# Patient Record
Sex: FEMALE | Race: BLACK OR AFRICAN AMERICAN | NOT HISPANIC OR LATINO | URBAN - METROPOLITAN AREA
[De-identification: names, ages, dates, MRNs, and addresses within clinical notes are randomized per-mention and may not be internally consistent; named-entity substitution may affect disease eponyms.]

---

## 2021-06-10 ENCOUNTER — OFFICE (OUTPATIENT)
Dept: URBAN - METROPOLITAN AREA CLINIC 10 | Facility: CLINIC | Age: 46
End: 2021-06-10

## 2021-06-10 VITALS
HEART RATE: 74 BPM | WEIGHT: 205 LBS | DIASTOLIC BLOOD PRESSURE: 74 MMHG | SYSTOLIC BLOOD PRESSURE: 117 MMHG | HEIGHT: 67 IN

## 2021-06-10 DIAGNOSIS — R74.8 ABNORMAL LEVELS OF OTHER SERUM ENZYMES: ICD-10-CM

## 2021-06-10 LAB
GGT: 185 IU/L — HIGH (ref 0–60)
HEPATIC FUNCTION PANEL (7): ALBUMIN: 4.9 G/DL — HIGH (ref 3.8–4.8)
HEPATIC FUNCTION PANEL (7): ALKALINE PHOSPHATASE: 122 IU/L — HIGH (ref 48–121)
HEPATIC FUNCTION PANEL (7): ALT (SGPT): 28 IU/L (ref 0–32)
HEPATIC FUNCTION PANEL (7): AST (SGOT): 29 IU/L (ref 0–40)
HEPATIC FUNCTION PANEL (7): BILIRUBIN, DIRECT: 0.11 MG/DL (ref 0–0.4)
HEPATIC FUNCTION PANEL (7): BILIRUBIN, TOTAL: 0.3 MG/DL (ref 0–1.2)
HEPATIC FUNCTION PANEL (7): PROTEIN, TOTAL: 8 G/DL (ref 6–8.5)
MITOCHONDRIAL (M2) ANTIBODY: <20 UNITS

## 2021-06-10 PROCEDURE — 99213 OFFICE O/P EST LOW 20 MIN: CPT | Performed by: PHYSICIAN ASSISTANT

## 2021-06-10 RX ORDER — SODIUM SULFATE, POTASSIUM SULFATE, MAGNESIUM SULFATE 17.5; 3.13; 1.6 G/ML; G/ML; G/ML
SOLUTION, CONCENTRATE ORAL
Qty: 1 | Refills: 0 | Status: COMPLETED
Start: 2021-06-10 | End: 2022-02-15

## 2021-07-27 ENCOUNTER — OFFICE (OUTPATIENT)
Dept: URBAN - METROPOLITAN AREA CLINIC 10 | Facility: CLINIC | Age: 46
End: 2021-07-27

## 2021-08-10 ENCOUNTER — OFFICE (OUTPATIENT)
Dept: URBAN - METROPOLITAN AREA CLINIC 10 | Facility: CLINIC | Age: 46
End: 2021-08-10

## 2021-08-10 VITALS
SYSTOLIC BLOOD PRESSURE: 120 MMHG | DIASTOLIC BLOOD PRESSURE: 62 MMHG | HEART RATE: 68 BPM | HEIGHT: 67 IN | OXYGEN SATURATION: 98 % | WEIGHT: 208 LBS

## 2021-08-10 DIAGNOSIS — R74.8 ABNORMAL LEVELS OF OTHER SERUM ENZYMES: ICD-10-CM

## 2021-08-10 LAB
ACTIN (SMOOTH MUSCLE) ANTIBODY: 15 UNITS (ref 0–19)
ANTINUCLEAR ANTIBODIES, IFA: NEGATIVE
GGT: 140 IU/L — HIGH (ref 0–60)
HEPATIC FUNCTION PANEL (7): ALBUMIN: 4.8 G/DL (ref 3.8–4.8)
HEPATIC FUNCTION PANEL (7): ALKALINE PHOSPHATASE: 120 IU/L (ref 48–121)
HEPATIC FUNCTION PANEL (7): ALT (SGPT): 25 IU/L (ref 0–32)
HEPATIC FUNCTION PANEL (7): AST (SGOT): 28 IU/L (ref 0–40)
HEPATIC FUNCTION PANEL (7): BILIRUBIN, DIRECT: 0.13 MG/DL (ref 0–0.4)
HEPATIC FUNCTION PANEL (7): BILIRUBIN, TOTAL: 0.4 MG/DL (ref 0–1.2)
HEPATIC FUNCTION PANEL (7): PROTEIN, TOTAL: 7.9 G/DL (ref 6–8.5)
IMMUNOGLOBULIN G, QN, SERUM: 1774 MG/DL — HIGH (ref 586–1602)

## 2021-08-10 PROCEDURE — 99213 OFFICE O/P EST LOW 20 MIN: CPT | Performed by: INTERNAL MEDICINE

## 2021-08-10 NOTE — SERVICEHPINOTES
Mrs. Crane is 45 years old. She is here today in referral for elevated ALP. She denies any ongoing abd pain particularly in the RUQ. She has no GI complaints. She denies EtOH use. Her insurance denied screening colonoscopy. She is no longer taking oral terbenafineShe has never had colon screening. SHe has no fam hx of CRC. She is interested in scheduling colon screening also at this time. 07/2021-AST 27/ALT 20/TB 0.3/alp 115/albumin 4.606/2021-abdominal ultrasound: Liver with normal appearance. Gallbladder normal. Common bile duct normal diameter.06/2021-AST 29/ALT 28/TB 0.3/alp 122/albumin 4.9BR-anti mitochondrial antibody negativeBR-GGT elevated at 185Records reviewed from PCP 05/05/2021:BRAlp 130BRAlp isoenzymes-liver 1 and liver 2 elevatedBRTotal bili 0.3BRFerritin 174/iron 6602/24/2021: WBC 4.6/hematocrit 34.9/MCV 83/platelets 214BRAST 27/ALT 23/alp 127/total bili 0.3/albumin 4.7

## 2022-02-15 ENCOUNTER — OFFICE (OUTPATIENT)
Dept: URBAN - METROPOLITAN AREA CLINIC 10 | Facility: CLINIC | Age: 47
End: 2022-02-15
Payer: COMMERCIAL

## 2022-02-15 VITALS
DIASTOLIC BLOOD PRESSURE: 66 MMHG | HEART RATE: 79 BPM | HEIGHT: 67 IN | WEIGHT: 214 LBS | OXYGEN SATURATION: 98 % | SYSTOLIC BLOOD PRESSURE: 129 MMHG

## 2022-02-15 DIAGNOSIS — R74.8 ABNORMAL LEVELS OF OTHER SERUM ENZYMES: ICD-10-CM

## 2022-02-15 DIAGNOSIS — Z12.11 ENCOUNTER FOR SCREENING FOR MALIGNANT NEOPLASM OF COLON: ICD-10-CM

## 2022-02-15 LAB
GGT: 97 IU/L — HIGH (ref 0–60)
HEPATIC FUNCTION PANEL (7): ALBUMIN: 4.5 G/DL (ref 3.8–4.8)
HEPATIC FUNCTION PANEL (7): ALKALINE PHOSPHATASE: 106 IU/L (ref 44–121)
HEPATIC FUNCTION PANEL (7): ALT (SGPT): 24 IU/L (ref 0–32)
HEPATIC FUNCTION PANEL (7): AST (SGOT): 23 IU/L (ref 0–40)
HEPATIC FUNCTION PANEL (7): BILIRUBIN, DIRECT: 0.13 MG/DL (ref 0–0.4)
HEPATIC FUNCTION PANEL (7): BILIRUBIN, TOTAL: 0.4 MG/DL (ref 0–1.2)
HEPATIC FUNCTION PANEL (7): PROTEIN, TOTAL: 7.4 G/DL (ref 6–8.5)
IMMUNOGLOBULIN G, QN, SERUM: 1516 MG/DL (ref 586–1602)
LIVER-KIDNEY MICROSOMAL AB: 1.8 UNITS (ref 0–20)

## 2022-02-15 PROCEDURE — 99214 OFFICE O/P EST MOD 30 MIN: CPT | Performed by: INTERNAL MEDICINE

## 2022-02-15 NOTE — SERVICEHPINOTES
Mrs. Crane is 45 years old. She is here today in for elevated ALP at referring physicains however, they have been normal at our office. She denies EtOH use. She denies any GI symptoms. She denies itching or increased fatigue.She has never had colon screening. Se has no fam history of colon cancer.
br
br08/2021-AST 28/ALT 25/TB 0.4/alp 120/albumin 4.8br-smooth muscle antibody negativebr-LOC negativebr-GGT 140br-IgG 1774 07/2021-AST 27/ALT 20/TB 0.3/alp 115/albumin 4.606/2021-abdominal ultrasound: Liver with normal appearance. Gallbladder normal. Common bile duct normal diameter.06/2021-AST 29/ALT 28/TB 0.3/alp 122/albumin 4.9br-anti mitochondrial antibody negativebr-GGT elevated at 185Records reviewed from PCP 05/05/2021:brAlp 130brAlp isoenzymes-liver 1 and liver 2 elevatedbrTotal bili 0.3brFerritin 174/iron 6602/24/2021: WBC 4.6/hematocrit 34.9/MCV 83/platelets 214brAST 27/ALT 23/alp 127/total bili 0.3/albumin 4.7

## 2022-02-15 NOTE — SERVICENOTES
We discussed screening colonoscopy. She is not interested currently. We discussed considering liver biopsy; however, she would like to see results of lab tests.

## 2022-08-23 ENCOUNTER — OFFICE (OUTPATIENT)
Dept: URBAN - METROPOLITAN AREA CLINIC 10 | Facility: CLINIC | Age: 47
End: 2022-08-23

## 2022-08-23 VITALS
HEART RATE: 65 BPM | WEIGHT: 206 LBS | DIASTOLIC BLOOD PRESSURE: 74 MMHG | SYSTOLIC BLOOD PRESSURE: 119 MMHG | OXYGEN SATURATION: 98 % | HEIGHT: 67 IN

## 2022-08-23 DIAGNOSIS — R74.8 ABNORMAL LEVELS OF OTHER SERUM ENZYMES: ICD-10-CM

## 2022-08-23 LAB
GGT: 87 IU/L — HIGH (ref 0–60)
HEPATIC FUNCTION PANEL (7): ALBUMIN: 4.8 G/DL (ref 3.8–4.8)
HEPATIC FUNCTION PANEL (7): ALKALINE PHOSPHATASE: 94 IU/L (ref 44–121)
HEPATIC FUNCTION PANEL (7): ALT (SGPT): 16 IU/L (ref 0–32)
HEPATIC FUNCTION PANEL (7): AST (SGOT): 22 IU/L (ref 0–40)
HEPATIC FUNCTION PANEL (7): BILIRUBIN, DIRECT: <0.1 MG/DL
HEPATIC FUNCTION PANEL (7): BILIRUBIN, TOTAL: 0.3 MG/DL (ref 0–1.2)
HEPATIC FUNCTION PANEL (7): PROTEIN, TOTAL: 7.7 G/DL (ref 6–8.5)

## 2022-08-23 PROCEDURE — 99213 OFFICE O/P EST LOW 20 MIN: CPT | Performed by: INTERNAL MEDICINE

## 2023-08-15 ENCOUNTER — OFFICE (OUTPATIENT)
Dept: URBAN - METROPOLITAN AREA CLINIC 10 | Facility: CLINIC | Age: 48
End: 2023-08-15

## 2023-08-15 VITALS
SYSTOLIC BLOOD PRESSURE: 123 MMHG | WEIGHT: 209 LBS | HEART RATE: 55 BPM | OXYGEN SATURATION: 95 % | HEIGHT: 67 IN | DIASTOLIC BLOOD PRESSURE: 61 MMHG

## 2023-08-15 DIAGNOSIS — R74.8 ABNORMAL LEVELS OF OTHER SERUM ENZYMES: ICD-10-CM

## 2023-08-15 PROCEDURE — 99213 OFFICE O/P EST LOW 20 MIN: CPT | Performed by: INTERNAL MEDICINE

## 2023-08-16 LAB
CBC, PLATELET, NO DIFFERENTIAL: HEMATOCRIT: 42.5 % (ref 34–46.6)
CBC, PLATELET, NO DIFFERENTIAL: HEMOGLOBIN: 13.7 G/DL (ref 11.1–15.9)
CBC, PLATELET, NO DIFFERENTIAL: MCH: 28.7 PG (ref 26.6–33)
CBC, PLATELET, NO DIFFERENTIAL: MCHC: 32.2 G/DL (ref 31.5–35.7)
CBC, PLATELET, NO DIFFERENTIAL: MCV: 89 FL (ref 79–97)
CBC, PLATELET, NO DIFFERENTIAL: NRBC: (no result)
CBC, PLATELET, NO DIFFERENTIAL: PLATELETS: 224 X10E3/UL (ref 150–450)
CBC, PLATELET, NO DIFFERENTIAL: RBC: 4.77 X10E6/UL (ref 3.77–5.28)
CBC, PLATELET, NO DIFFERENTIAL: RDW: 13.4 % (ref 11.7–15.4)
CBC, PLATELET, NO DIFFERENTIAL: WBC: 5.9 X10E3/UL (ref 3.4–10.8)
GGT: 101 IU/L — HIGH (ref 0–60)
HEPATIC FUNCTION PANEL (7): ALBUMIN: 4.8 G/DL (ref 3.9–4.9)
HEPATIC FUNCTION PANEL (7): ALKALINE PHOSPHATASE: 101 IU/L (ref 44–121)
HEPATIC FUNCTION PANEL (7): ALT (SGPT): 30 IU/L (ref 0–32)
HEPATIC FUNCTION PANEL (7): AST (SGOT): 29 IU/L (ref 0–40)
HEPATIC FUNCTION PANEL (7): BILIRUBIN, DIRECT: <0.1 MG/DL
HEPATIC FUNCTION PANEL (7): BILIRUBIN, TOTAL: 0.3 MG/DL (ref 0–1.2)
HEPATIC FUNCTION PANEL (7): PROTEIN, TOTAL: 8.1 G/DL (ref 6–8.5)

## 2024-02-27 ENCOUNTER — OFFICE (OUTPATIENT)
Dept: URBAN - METROPOLITAN AREA CLINIC 10 | Facility: CLINIC | Age: 49
End: 2024-02-27
Payer: COMMERCIAL

## 2024-02-27 VITALS
DIASTOLIC BLOOD PRESSURE: 60 MMHG | SYSTOLIC BLOOD PRESSURE: 120 MMHG | WEIGHT: 214 LBS | OXYGEN SATURATION: 97 % | HEART RATE: 66 BPM | HEIGHT: 67 IN

## 2024-02-27 DIAGNOSIS — K92.2 GASTROINTESTINAL HEMORRHAGE, UNSPECIFIED: ICD-10-CM

## 2024-02-27 DIAGNOSIS — R10.13 EPIGASTRIC PAIN: ICD-10-CM

## 2024-02-27 PROCEDURE — 99214 OFFICE O/P EST MOD 30 MIN: CPT | Performed by: NURSE PRACTITIONER

## 2024-02-27 RX ORDER — OMEPRAZOLE 40 MG/1
40 CAPSULE, DELAYED RELEASE ORAL
Qty: 30 | Refills: 3 | Status: ACTIVE
Start: 2024-02-27

## 2024-02-27 RX ORDER — POLYETHYLENE GLYCOL 3350, SODIUM SULFATE, SODIUM CHLORIDE, POTASSIUM CHLORIDE, ASCORBIC ACID, SODIUM ASCORBATE 140-9-5.2G
KIT ORAL
Qty: 1 | Refills: 0 | Status: COMPLETED
Start: 2024-02-27 | End: 2024-04-02

## 2024-02-27 NOTE — SERVICENOTES
She declined rectal exam today. If all the above negative I will consider HIDA scan as symptoms could represent biliary dyskinesia vs colic.

## 2024-02-27 NOTE — SERVICEHPINOTES
Ms. Crane is a 48 year old female. She is here today for first visit with chief complaint epigastric pain. She reports an onset of intermittent epigastric pain which began around September of 2023. Pain is sharp, stabbing and abrupt radiating to her back. Onset is random and may occur as much as a few times per week or once per month. Although she denies any inciting factors during typical events, eating spicy foods has triggered her pain on occasion. She began a trial of OTC Zantac 20mg once daily about two weeks ago which has not helped. She denies any recent imaging. Denies any RUQ pain, nausea or vomiting, reflux or indigestion. No dysphagia. Denies NSAID use.br
br She reports intermittent rectal bleeding described as being on toilet tissue and occasionally in toilet bowl. No unintended weight loss. She had a negative cologuard study in 2021 but has never had colonoscopy. Denies any family history of CRC.

## 2024-04-02 ENCOUNTER — OFFICE (OUTPATIENT)
Dept: URBAN - METROPOLITAN AREA PATHOLOGY 12 | Facility: PATHOLOGY | Age: 49
End: 2024-04-02
Payer: COMMERCIAL

## 2024-04-02 ENCOUNTER — AMBULATORY SURGICAL CENTER (OUTPATIENT)
Dept: URBAN - METROPOLITAN AREA SURGERY 1 | Facility: SURGERY | Age: 49
End: 2024-04-02
Payer: COMMERCIAL

## 2024-04-02 VITALS
HEIGHT: 67 IN | HEART RATE: 61 BPM | SYSTOLIC BLOOD PRESSURE: 114 MMHG | SYSTOLIC BLOOD PRESSURE: 116 MMHG | OXYGEN SATURATION: 98 % | RESPIRATION RATE: 18 BRPM | DIASTOLIC BLOOD PRESSURE: 64 MMHG | OXYGEN SATURATION: 96 % | DIASTOLIC BLOOD PRESSURE: 70 MMHG | DIASTOLIC BLOOD PRESSURE: 64 MMHG | OXYGEN SATURATION: 96 % | HEART RATE: 69 BPM | OXYGEN SATURATION: 99 % | OXYGEN SATURATION: 94 % | OXYGEN SATURATION: 99 % | RESPIRATION RATE: 18 BRPM | HEART RATE: 69 BPM | HEART RATE: 61 BPM | RESPIRATION RATE: 18 BRPM | SYSTOLIC BLOOD PRESSURE: 115 MMHG | HEART RATE: 62 BPM | HEART RATE: 69 BPM | HEART RATE: 60 BPM | DIASTOLIC BLOOD PRESSURE: 63 MMHG | HEART RATE: 59 BPM | DIASTOLIC BLOOD PRESSURE: 78 MMHG | SYSTOLIC BLOOD PRESSURE: 132 MMHG | OXYGEN SATURATION: 99 % | WEIGHT: 215 LBS | OXYGEN SATURATION: 98 % | DIASTOLIC BLOOD PRESSURE: 63 MMHG | HEART RATE: 59 BPM | OXYGEN SATURATION: 94 % | RESPIRATION RATE: 16 BRPM | DIASTOLIC BLOOD PRESSURE: 78 MMHG | OXYGEN SATURATION: 94 % | DIASTOLIC BLOOD PRESSURE: 63 MMHG | SYSTOLIC BLOOD PRESSURE: 114 MMHG | DIASTOLIC BLOOD PRESSURE: 78 MMHG | SYSTOLIC BLOOD PRESSURE: 115 MMHG | DIASTOLIC BLOOD PRESSURE: 64 MMHG | RESPIRATION RATE: 16 BRPM | HEIGHT: 67 IN | RESPIRATION RATE: 16 BRPM | HEART RATE: 59 BPM | SYSTOLIC BLOOD PRESSURE: 116 MMHG | HEART RATE: 60 BPM | SYSTOLIC BLOOD PRESSURE: 115 MMHG | SYSTOLIC BLOOD PRESSURE: 132 MMHG | SYSTOLIC BLOOD PRESSURE: 116 MMHG | HEIGHT: 67 IN | SYSTOLIC BLOOD PRESSURE: 132 MMHG | DIASTOLIC BLOOD PRESSURE: 70 MMHG | HEART RATE: 61 BPM | HEART RATE: 62 BPM | TEMPERATURE: 98.2 F | HEART RATE: 62 BPM | TEMPERATURE: 98.2 F | DIASTOLIC BLOOD PRESSURE: 70 MMHG | HEART RATE: 60 BPM | WEIGHT: 215 LBS | SYSTOLIC BLOOD PRESSURE: 114 MMHG | OXYGEN SATURATION: 96 % | WEIGHT: 215 LBS | TEMPERATURE: 98.2 F | OXYGEN SATURATION: 98 %

## 2024-04-02 DIAGNOSIS — K63.5 POLYP OF COLON: ICD-10-CM

## 2024-04-02 DIAGNOSIS — Z12.11 ENCOUNTER FOR SCREENING FOR MALIGNANT NEOPLASM OF COLON: ICD-10-CM

## 2024-04-02 DIAGNOSIS — K64.8 OTHER HEMORRHOIDS: ICD-10-CM

## 2024-04-02 DIAGNOSIS — K64.4 RESIDUAL HEMORRHOIDAL SKIN TAGS: ICD-10-CM

## 2024-04-02 PROCEDURE — 45385 COLONOSCOPY W/LESION REMOVAL: CPT | Mod: 33 | Performed by: INTERNAL MEDICINE

## 2024-04-02 PROCEDURE — 88305 TISSUE EXAM BY PATHOLOGIST: CPT | Performed by: PATHOLOGY

## 2024-04-02 PROCEDURE — 88313 SPECIAL STAINS GROUP 2: CPT | Performed by: PATHOLOGY

## 2024-04-10 LAB
GASTRO ONE PATHOLOGY: PDF REPORT: (no result)

## 2025-02-12 ENCOUNTER — OFFICE (OUTPATIENT)
Dept: URBAN - METROPOLITAN AREA CLINIC 10 | Facility: CLINIC | Age: 50
End: 2025-02-12

## 2025-02-12 VITALS
SYSTOLIC BLOOD PRESSURE: 119 MMHG | DIASTOLIC BLOOD PRESSURE: 75 MMHG | WEIGHT: 215 LBS | HEART RATE: 68 BPM | HEIGHT: 67 IN | OXYGEN SATURATION: 94 %

## 2025-02-12 DIAGNOSIS — K58.0 IRRITABLE BOWEL SYNDROME WITH DIARRHEA: ICD-10-CM

## 2025-02-12 DIAGNOSIS — K21.9 GASTRO-ESOPHAGEAL REFLUX DISEASE WITHOUT ESOPHAGITIS: ICD-10-CM

## 2025-02-12 PROCEDURE — 99214 OFFICE O/P EST MOD 30 MIN: CPT | Performed by: NURSE PRACTITIONER

## 2025-02-12 RX ORDER — OMEPRAZOLE 40 MG/1
CAPSULE, DELAYED RELEASE ORAL
Qty: 90 | Refills: 3 | Status: ACTIVE